# Patient Record
Sex: FEMALE | Race: BLACK OR AFRICAN AMERICAN | NOT HISPANIC OR LATINO | Employment: OTHER | URBAN - METROPOLITAN AREA
[De-identification: names, ages, dates, MRNs, and addresses within clinical notes are randomized per-mention and may not be internally consistent; named-entity substitution may affect disease eponyms.]

---

## 2019-05-21 LAB — A1C: 6.3 % (ref 3.9–6.1)

## 2019-05-22 PROBLEM — M79.7 FIBROMYALGIA: Status: ACTIVE | Noted: 2019-05-22

## 2019-05-22 PROBLEM — D12.8 TUBULAR ADENOMA POLYP OF RECTUM: Status: ACTIVE | Noted: 2019-05-22

## 2019-05-22 PROBLEM — M85.851 OSTEOPENIA OF NECK OF RIGHT FEMUR: Status: ACTIVE | Noted: 2019-05-22

## 2019-05-22 PROBLEM — I10 ESSENTIAL HYPERTENSION: Status: ACTIVE | Noted: 2019-05-22

## 2019-05-22 PROBLEM — E11.9 TYPE 2 DIABETES MELLITUS WITHOUT COMPLICATION, WITHOUT LONG-TERM CURRENT USE OF INSULIN: Status: ACTIVE | Noted: 2019-05-22

## 2019-09-10 PROBLEM — Z96.1 PSEUDOPHAKIA OF LEFT EYE: Status: ACTIVE | Noted: 2019-09-10

## 2019-09-10 PROBLEM — H26.9 EARLY CATARACT: Status: ACTIVE | Noted: 2019-09-10

## 2019-09-10 PROBLEM — E11.9 DIABETES MELLITUS WITHOUT COMPLICATION: Status: ACTIVE | Noted: 2019-09-10

## 2019-11-25 PROBLEM — G89.29 CHRONIC PAIN: Status: ACTIVE | Noted: 2019-11-25

## 2019-11-25 PROBLEM — J30.9 ALLERGIC RHINITIS: Status: ACTIVE | Noted: 2017-04-19

## 2019-11-25 PROBLEM — H25.12 AGE-RELATED NUCLEAR CATARACT OF LEFT EYE: Status: ACTIVE | Noted: 2017-10-05

## 2019-11-25 PROBLEM — J44.9 COPD (CHRONIC OBSTRUCTIVE PULMONARY DISEASE): Status: ACTIVE | Noted: 2018-10-08

## 2019-11-25 PROBLEM — N32.81 OVERACTIVE BLADDER: Status: ACTIVE | Noted: 2019-11-25

## 2020-03-02 PROBLEM — R49.0 DYSPHONIA: Status: ACTIVE | Noted: 2020-03-02

## 2020-03-02 PROBLEM — R09.A2 GLOBUS PHARYNGEUS: Status: ACTIVE | Noted: 2020-03-02

## 2020-03-02 PROBLEM — J38.3 PARADOXICAL VOCAL CORD MOTION: Status: ACTIVE | Noted: 2020-03-02

## 2020-05-21 PROBLEM — F32.9 MDD (MAJOR DEPRESSIVE DISORDER): Status: ACTIVE | Noted: 2020-05-21

## 2020-08-11 PROBLEM — K21.9 LARYNGOPHARYNGEAL REFLUX (LPR): Status: ACTIVE | Noted: 2020-08-11

## 2020-09-13 PROBLEM — H02.886 MEIBOMIAN GLAND DYSFUNCTION (MGD) OF BOTH EYES: Status: ACTIVE | Noted: 2020-09-13

## 2020-09-13 PROBLEM — H04.123 DRY EYE SYNDROME, BILATERAL: Status: ACTIVE | Noted: 2020-09-13

## 2020-09-13 PROBLEM — H02.883 MEIBOMIAN GLAND DYSFUNCTION (MGD) OF BOTH EYES: Status: ACTIVE | Noted: 2020-09-13

## 2020-11-10 PROBLEM — Z96.1 PSEUDOPHAKIA OF LEFT EYE: Status: RESOLVED | Noted: 2019-09-10 | Resolved: 2020-11-10

## 2020-11-10 PROBLEM — H02.886 MEIBOMIAN GLAND DYSFUNCTION (MGD) OF BOTH EYES: Status: RESOLVED | Noted: 2020-09-13 | Resolved: 2020-11-10

## 2020-11-10 PROBLEM — H26.9 EARLY CATARACT: Status: RESOLVED | Noted: 2019-09-10 | Resolved: 2020-11-10

## 2020-11-10 PROBLEM — H25.12 AGE-RELATED NUCLEAR CATARACT OF LEFT EYE: Status: RESOLVED | Noted: 2017-10-05 | Resolved: 2020-11-10

## 2020-11-10 PROBLEM — A04.8 BACTERIAL INFECTION DUE TO H. PYLORI: Status: ACTIVE | Noted: 2020-11-10

## 2020-11-10 PROBLEM — R49.0 DYSPHONIA: Status: RESOLVED | Noted: 2020-03-02 | Resolved: 2020-11-10

## 2020-11-10 PROBLEM — J38.3 PARADOXICAL VOCAL CORD MOTION: Status: RESOLVED | Noted: 2020-03-02 | Resolved: 2020-11-10

## 2020-11-10 PROBLEM — H04.123 DRY EYE SYNDROME, BILATERAL: Status: RESOLVED | Noted: 2020-09-13 | Resolved: 2020-11-10

## 2020-11-10 PROBLEM — H02.883 MEIBOMIAN GLAND DYSFUNCTION (MGD) OF BOTH EYES: Status: RESOLVED | Noted: 2020-09-13 | Resolved: 2020-11-10

## 2021-01-20 PROBLEM — E11.9 TYPE 2 DIABETES MELLITUS WITHOUT COMPLICATION: Status: RESOLVED | Noted: 2019-09-10 | Resolved: 2021-01-20

## 2021-01-20 PROBLEM — R06.01 ORTHOPNEA: Status: ACTIVE | Noted: 2021-01-20

## 2021-01-20 PROBLEM — Z00.00 HEALTHCARE MAINTENANCE: Status: ACTIVE | Noted: 2021-01-20

## 2021-01-20 PROBLEM — R06.00 DYSPNEA: Status: ACTIVE | Noted: 2021-01-20

## 2021-04-26 PROBLEM — Z00.00 HEALTHCARE MAINTENANCE: Status: RESOLVED | Noted: 2021-01-20 | Resolved: 2021-04-26

## 2021-08-16 PROBLEM — A04.8 BACTERIAL INFECTION DUE TO H. PYLORI: Status: RESOLVED | Noted: 2020-11-10 | Resolved: 2021-08-16

## 2021-08-20 PROBLEM — N32.81 OVERACTIVE BLADDER: Status: RESOLVED | Noted: 2019-11-25 | Resolved: 2021-08-20

## 2021-09-30 PROBLEM — H25.811 COMBINED FORMS OF AGE-RELATED CATARACT OF RIGHT EYE: Status: ACTIVE | Noted: 2021-09-30

## 2022-02-08 PROBLEM — M12.812 ROTATOR CUFF ARTHROPATHY OF BOTH SHOULDERS: Status: ACTIVE | Noted: 2022-02-08

## 2022-02-08 PROBLEM — M12.811 ROTATOR CUFF ARTHROPATHY OF BOTH SHOULDERS: Status: ACTIVE | Noted: 2022-02-08

## 2022-02-08 PROBLEM — R06.2 WHEEZING: Status: ACTIVE | Noted: 2022-02-08

## 2022-02-08 PROBLEM — M54.2 NECK PAIN: Status: ACTIVE | Noted: 2022-02-08

## 2022-04-28 PROBLEM — M79.18 MYOFASCIAL PAIN: Status: ACTIVE | Noted: 2022-04-28

## 2022-06-29 PROBLEM — R13.10 DYSPHAGIA: Status: ACTIVE | Noted: 2022-06-29

## 2022-10-14 ENCOUNTER — NURSE TRIAGE (OUTPATIENT)
Dept: ADMINISTRATIVE | Facility: CLINIC | Age: 67
End: 2022-10-14

## 2022-10-14 NOTE — TELEPHONE ENCOUNTER
Patient state she had a fall on 8/18 and she is not getting any better. Patient reports back pain on right side. Radiates from neck to mid back described as stabbing and tender to touch in spots. Patient states she would like to speak to Dr. Gutierrez about anything else that can be done. Advised patient of dispo to be seen today. Verbalized understanding. Advised to call back if symptoms become worse or with further questions.      Reason for Disposition   SEVERE back pain (e.g., excruciating, unable to do any normal activities) and not improved after pain medicine and CARE ADVICE    Additional Information   Negative: Passed out (i.e., fainted, collapsed and was not responding)   Negative: Shock suspected (e.g., cold/pale/clammy skin, too weak to stand, low BP, rapid pulse)   Negative: Sounds like a life-threatening emergency to the triager   Negative: SEVERE back pain of sudden onset and age > 60 years   Negative: SEVERE abdominal pain (e.g., excruciating)   Negative: Abdominal pain and age > 60 years   Negative: Unable to urinate (or only a few drops) and bladder feels very full   Negative: Loss of bladder or bowel control (urine or bowel incontinence; wetting self, leaking stool) of new-onset   Negative: Numbness (loss of sensation) in groin or rectal area   Negative: Pain radiates into groin, scrotum   Negative: Blood in urine (red, pink, or tea-colored)   Negative: Vomiting and pain over lower ribs of back (i.e., flank - kidney area)   Negative: Weakness of a leg or foot (e.g., unable to bear weight, dragging foot)   Negative: Patient sounds very sick or weak to the triager   Negative: Fever > 100.4 F (38.0 C) and flank pain   Negative: Pain or burning with passing urine (urination)    Protocols used: Back Pain-A-[facility]

## 2022-10-17 PROBLEM — M62.838 MUSCLE SPASM: Status: ACTIVE | Noted: 2022-10-17

## 2022-10-17 PROBLEM — Z23 IMMUNIZATION DUE: Status: ACTIVE | Noted: 2022-10-17

## 2022-12-14 PROBLEM — B35.6 TINEA CRURIS: Status: ACTIVE | Noted: 2022-12-14

## 2022-12-29 DIAGNOSIS — U07.1 COVID-19 VIRUS DETECTED: ICD-10-CM

## 2023-06-23 ENCOUNTER — PATIENT MESSAGE (OUTPATIENT)
Dept: ADMINISTRATIVE | Facility: OTHER | Age: 68
End: 2023-06-23

## 2024-01-11 DIAGNOSIS — Z00.00 ENCOUNTER FOR MEDICARE ANNUAL WELLNESS EXAM: ICD-10-CM

## 2024-02-14 DIAGNOSIS — U07.1 COVID-19 VIRUS DETECTED: ICD-10-CM

## 2024-02-19 ENCOUNTER — PATIENT MESSAGE (OUTPATIENT)
Dept: ADMINISTRATIVE | Facility: HOSPITAL | Age: 69
End: 2024-02-19

## 2024-02-28 ENCOUNTER — NURSE TRIAGE (OUTPATIENT)
Dept: ADMINISTRATIVE | Facility: CLINIC | Age: 69
End: 2024-02-28

## 2024-02-28 NOTE — TELEPHONE ENCOUNTER
Spoke with patient who states she tested positive for covid on 2/14/24.  Patient states 6 days later she tested negative (home test).  Patient would like to know when she can take her covid booster vaccine.  Patient denies having any symptoms at this time. Informed patient that message would be sent to PCP office with a callback by nurse.  Patient has no further needs or questions at this time.   Reason for Disposition   Nursing judgment    Protocols used: Information Only Call - No Triage-A-[facility]

## 2024-02-29 ENCOUNTER — PATIENT MESSAGE (OUTPATIENT)
Dept: ADMINISTRATIVE | Facility: HOSPITAL | Age: 69
End: 2024-02-29

## 2024-02-29 ENCOUNTER — PATIENT OUTREACH (OUTPATIENT)
Dept: ADMINISTRATIVE | Facility: HOSPITAL | Age: 69
End: 2024-02-29

## 2024-05-02 PROBLEM — K21.9 GASTROESOPHAGEAL REFLUX DISEASE WITHOUT ESOPHAGITIS: Status: ACTIVE | Noted: 2024-05-02

## 2024-05-02 PROBLEM — Z86.19 HISTORY OF HELICOBACTER PYLORI INFECTION: Status: ACTIVE | Noted: 2024-05-02

## 2024-06-27 PROBLEM — M26.623 BILATERAL TEMPOROMANDIBULAR JOINT PAIN: Status: ACTIVE | Noted: 2024-06-27

## 2024-07-13 ENCOUNTER — NURSE TRIAGE (OUTPATIENT)
Dept: ADMINISTRATIVE | Facility: CLINIC | Age: 69
End: 2024-07-13

## 2024-07-14 PROBLEM — M53.3 CHRONIC LEFT SI JOINT PAIN: Status: ACTIVE | Noted: 2024-07-14

## 2024-07-14 PROBLEM — M16.12 PRIMARY OSTEOARTHRITIS OF LEFT HIP: Status: ACTIVE | Noted: 2024-07-14

## 2024-07-14 PROBLEM — G89.29 CHRONIC LEFT SI JOINT PAIN: Status: ACTIVE | Noted: 2024-07-14

## 2024-07-14 NOTE — TELEPHONE ENCOUNTER
Pt is having pain in her left hip. Onset was yesterday with no injury but patient did  a heavy box about a week ago. 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 Pain is worse with certain movements. Pt was given toradol and told to continue with her muscle relaxers. Pain is currently 10/10. Dispo provided-see pcp within 4 hours. Unable to schedule visit, suggested ER. Pt verbalized understanding.   Reason for Disposition   [1] SEVERE pain (e.g., excruciating, unable to do any normal activities) AND [2] not improved after 2 hours of pain medicine    Additional Information   Negative: Looks like a broken bone or dislocated joint (e.g., crooked or deformed)   Negative: Sounds like a life-threatening emergency to the triager   Negative: [1] SEVERE pain (e.g., excruciating, unable to do any normal activities) AND [2] fever   Negative: Can't stand (bear weight) or walk   Negative: [1] Red area or streak AND [2] fever   Negative: Patient sounds very sick or weak to the triager    Protocols used: Hip Pain-A-[facility]

## 2024-11-08 PROBLEM — R13.10 DYSPHAGIA: Status: RESOLVED | Noted: 2022-06-29 | Resolved: 2024-11-08

## 2025-01-10 ENCOUNTER — NURSE TRIAGE (OUTPATIENT)
Dept: ADMINISTRATIVE | Facility: CLINIC | Age: 70
End: 2025-01-10

## 2025-01-10 NOTE — TELEPHONE ENCOUNTER
"Angelia reports diagnosed with Flu on 12/25/25, completed prescribed meds. Went back to [facility] on 1/4/25 for feeling like something was in the throat. Pt c/o difficulty swallowing that has gotten worse since seen on 1/4/25, states it feels like a hunk of cold is stuck in throat and can't cough it up." +Hoarseness  Denies SOB, cough, chest pain and sore throat.  Advised pt per triage protocol to call  now. V/u.     Reason for Disposition   Wheezing, stridor, hoarseness, or difficulty breathing    Additional Information   Negative: SEVERE difficulty swallowing (e.g., drooling or spitting) and started suddenly after taking a medicine or allergic foods    Protocols used: Swallowing Difficulty-A-[facility]    "

## 2025-01-30 ENCOUNTER — PATIENT OUTREACH (OUTPATIENT)
Dept: ADMINISTRATIVE | Facility: HOSPITAL | Age: 70
End: 2025-01-30

## 2025-01-30 DIAGNOSIS — E11.9 TYPE 2 DIABETES MELLITUS WITHOUT COMPLICATION, WITHOUT LONG-TERM CURRENT USE OF INSULIN: Primary | ICD-10-CM
